# Patient Record
(demographics unavailable — no encounter records)

---

## 2025-04-30 NOTE — OB HISTORY
[Total Preg ___] : : [unfilled] [Full Term ___] : [unfilled] (full-term) [Abortions ___] : [unfilled] (abortions) [Living ___] : [unfilled] (living) [Vaginal ___] : [unfilled] vaginal delivery(s) [ ___] : [unfilled]  section delivery(s) [unknown] : the patient is unsure of the date of her LMP [Menarche Age ____] : age at menarche was [unfilled] [Menopause  Age ____] : menopause occurred at age [unfilled]

## 2025-05-04 NOTE — PHYSICAL EXAM
[MA] : MA [Absent] : CVAT: absent [Abnormal] : Bimanual Exam: Abnormal [Normal] : Cervix: Normal, no lesions [FreeTextEntry2] : Alyx [de-identified] : atrophy, scant thin discharge [de-identified] : The uterus was enlarged [de-identified] : The adnexae were nonpalp [de-identified] : The uterus was enlarged with a full ALEXIA

## 2025-05-04 NOTE — DISCUSSION/SUMMARY
[Reviewed Clinical Lab Test(s)] : Results of clinical tests were reviewed. [Reviewed Radiology Report(s)] : Radiology reports were reviewed. [FreeTextEntry1] : I met with the pt. -We discussed the clinical findings and the DDx, incl poss of malignancy (EM/Myoma).  -Management options were reviewed, including my advice for operative uterine sampling, given the described limited sampling. We disc the nature of D&C, H/D, as well as the rationale for sono guidance. We disc the pot for an unsuccessful proc as well as the poss need for further intervention.  -We also disc that there may be indication for a hyst/BSO if the myomatous change has increased in this stage of life (disc'd LMS). Disc that if this is the , I would favor an open proc through a vert inc. -Periop and recuperative issues were discussed, as were the possible hazards and risks of surgery, including but not limited to infection, thromboembolic disease and its life-threatening nature, transfusion, and surrounding organ injury (urinary, bowel, vascular, and neural), incision issues. -A diagram was drawn, and a copy provided. -I advised she review the option of a genetics evaluation and its rationale.  -I advised a clearance should surg be planned.  -Her instructions were reviewed. -All Q/A. -She states that she will f/u after she sees RR in f/u.  Addend: I spoke with RR re my eval. We also reviewed the available prior imaging going back to 2018, which seem to indicate an enlargement of the myomatous uterus during the postmenop time frame.  -Effort for the visit includes the note prep, review of prior material, interview, exam, further documentation, and coordination of care. -Effort for the visit includes the note prep, review of prior material, interview, exam, further documentation, and coordination of care.

## 2025-05-04 NOTE — HISTORY OF PRESENT ILLNESS
[FreeTextEntry1] : Referred by Dr. Abhijit Woodward Breast surg: Dr. Liz Pacheco PCP: Dr. Checo Capps   Ms. TUTTLE is a 65 year old  female LMP age 55, seen at the request of Dr. Abhijit Woodward for further evaluation of a thickened endometrium. She reports no post-menopausal bleeding or pelvic pain. Patient was originally referred to this office in 2019 by Dr. Abhijit Woodward for evaluation of thickened endometrial lining. Per patient report, the endometrial biopsy at that time was negative. She followed up in 2018 and another biopsy was obtained, also negative. Pt had sampling performed in this office with negative pathology. She was advised to follow-up with Dr. Woodward for GYN care.   2025 EMBx 1. Endometrium, biopsy - Rare and minute strips of inactive/atrophic endometrial epithelium within hemorrhagic material  4/10/2025 MR Pelvis ([facility] at [facility]) Uterus: Measures 7.8 x 9.1 x 11.1 cm. A solitary intramural myoma occupies the left uterine fundus and body measuring 7.4 x 7.5 x 7.0 cm.  The endometrial canal measures 1.6 cm, dilated for a post-menopausal female.  The junctional zone remains unremarkable.  No cervical abnormality is seen. Ovaries are identified and appear unremarkable. No pathologically enlarged pelvic lymph nodes are seen. There is no free fluid. The osseous structures appear normal. Impression: 1. Solitary intramural myoma occupying the left uterine fundus and body. 2. Dilated endometrial canal for a post-menopausal female  PMHx- HTN, GERD, asthma PSHx-  x1, myomectomy via Pfannenstiel incision with Dr. Knight in  (~20cm myoma, ext adhesions), LSC cholecystectomy, Rt breast lumpectomy (benign) Family hx of cancer- Sister with breast cancer, diagnosed in her 50s. Father with gastric cancer, diagnosed at age 70. No reported genetic testing.      Pap- 2024 Negative/HPV negative, has GYN appt on  Mammo- 2024 Calcified nodule noted in Rt breast, s/p lumpectomy in 2025, benign path. Report in chart. BHM by Dr. Liz Pacheco Colonoscopy- , benign findings per patient report. Due to repeat screening in 5 years.

## 2025-05-04 NOTE — HISTORY OF PRESENT ILLNESS
[FreeTextEntry1] : Referred by Dr. Abhijit Woodward Breast surg: Dr. Liz Pacheco PCP: Dr. Checo Capps   Ms. TUTTLE is a 65 year old  female LMP age 55, seen at the request of Dr. Abhijit Woodward for further evaluation of a thickened endometrium. She reports no post-menopausal bleeding or pelvic pain. Patient was originally referred to this office in 2019 by Dr. Abhijit Woodward for evaluation of thickened endometrial lining. Per patient report, the endometrial biopsy at that time was negative. She followed up in 2018 and another biopsy was obtained, also negative. Pt had sampling performed in this office with negative pathology. She was advised to follow-up with Dr. Woodward for GYN care.   2025 EMBx 1. Endometrium, biopsy - Rare and minute strips of inactive/atrophic endometrial epithelium within hemorrhagic material  4/10/2025 MR Pelvis ([facility] at [facility]) Uterus: Measures 7.8 x 9.1 x 11.1 cm. A solitary intramural myoma occupies the left uterine fundus and body measuring 7.4 x 7.5 x 7.0 cm.  The endometrial canal measures 1.6 cm, dilated for a post-menopausal female.  The junctional zone remains unremarkable.  No cervical abnormality is seen. Ovaries are identified and appear unremarkable. No pathologically enlarged pelvic lymph nodes are seen. There is no free fluid. The osseous structures appear normal. Impression: 1. Solitary intramural myoma occupying the left uterine fundus and body. 2. Dilated endometrial canal for a post-menopausal female  PMHx- HTN, GERD, asthma PSHx-  x1, myomectomy via Pfannenstiel incision with Dr. Knight in  (~20cm myoma, ext adhesions), LSC cholecystectomy, Rt breast lumpectomy (benign) Family hx of cancer- Sister with breast cancer, diagnosed in her 50s. Father with gastric cancer, diagnosed at age 70. No reported genetic testing.      Pap- 2024 Negative/HPV negative, has GYN appt on  Mammo- 2024 Calcified nodule noted in Rt breast, s/p lumpectomy in 2025, benign path. Report in chart. BHM by Dr. Liz Pacheco Colonoscopy- , benign findings per patient report. Due to repeat screening in 5 years.

## 2025-05-04 NOTE — PAST MEDICAL HISTORY
[Postmenopausal] : The patient is postmenopausal [Menarche Age ____] : age at menarche was [unfilled] [Menopause Age____] : age at menopause was [unfilled] [unknown] : the patient is unsure of the date of her LMP [Total Preg ___] : G[unfilled] [Live Births ___] : P[unfilled]  [Full Term ___] : Full Term: [unfilled] [Abortions ___] : Abortions:[unfilled] [Living ___] : Living: [unfilled] [AB Induced ___] : elective abortions: [unfilled]  [AB Spont ___] : miscarriages: [unfilled]  [FreeTextEntry5] : , myomectomy

## 2025-05-04 NOTE — PHYSICAL EXAM
[MA] : MA [Absent] : CVAT: absent [Abnormal] : Bimanual Exam: Abnormal [Normal] : Cervix: Normal, no lesions [FreeTextEntry2] : Alyx [de-identified] : atrophy, scant thin discharge [de-identified] : The uterus was enlarged [de-identified] : The adnexae were nonpalp [de-identified] : The uterus was enlarged with a full ALEXIA

## 2025-05-04 NOTE — HISTORY OF PRESENT ILLNESS
[FreeTextEntry1] : Referred by Dr. Abhijit Woodward Breast surg: Dr. Liz Pacheco PCP: Dr. Checo Capps   Ms. TUTTLE is a 65 year old  female LMP age 55, seen at the request of Dr. Abhijit Woodward for further evaluation of a thickened endometrium. She reports no post-menopausal bleeding or pelvic pain. Patient was originally referred to this office in 2019 by Dr. Abhijit Woodward for evaluation of thickened endometrial lining. Per patient report, the endometrial biopsy at that time was negative. She followed up in 2018 and another biopsy was obtained, also negative. Pt had sampling performed in this office with negative pathology. She was advised to follow-up with Dr. Woodward for GYN care.   2025 EMBx 1. Endometrium, biopsy - Rare and minute strips of inactive/atrophic endometrial epithelium within hemorrhagic material  4/10/2025 MR Pelvis ([facility] at [facility]) Uterus: Measures 7.8 x 9.1 x 11.1 cm. A solitary intramural myoma occupies the left uterine fundus and body measuring 7.4 x 7.5 x 7.0 cm.  The endometrial canal measures 1.6 cm, dilated for a post-menopausal female.  The junctional zone remains unremarkable.  No cervical abnormality is seen. Ovaries are identified and appear unremarkable. No pathologically enlarged pelvic lymph nodes are seen. There is no free fluid. The osseous structures appear normal. Impression: 1. Solitary intramural myoma occupying the left uterine fundus and body. 2. Dilated endometrial canal for a post-menopausal female  PMHx- HTN, GERD, asthma PSHx-  x1, myomectomy via Pfannenstiel incision with Dr. Knight in  (~20cm myoma, ext adhesions), LSC cholecystectomy, Rt breast lumpectomy (benign) Family hx of cancer- Sister with breast cancer, diagnosed in her 50s. Father with gastric cancer, diagnosed at age 70. No reported genetic testing.      Pap- 2024 Negative/HPV negative, has GYN appt on  Mammo- 2024 Calcified nodule noted in Rt breast, s/p lumpectomy in 2025, benign path. Report in chart. BHM by Dr. Liz Pacheco Colonoscopy- , benign findings per patient report. Due to repeat screening in 5 years.

## 2025-05-04 NOTE — PHYSICAL EXAM
[MA] : MA [Absent] : CVAT: absent [Abnormal] : Bimanual Exam: Abnormal [Normal] : Cervix: Normal, no lesions [FreeTextEntry2] : Alyx [de-identified] : atrophy, scant thin discharge [de-identified] : The uterus was enlarged [de-identified] : The adnexae were nonpalp [de-identified] : The uterus was enlarged with a full ALEXIA

## 2025-05-04 NOTE — HISTORY OF PRESENT ILLNESS
[FreeTextEntry1] : Referred by Dr. Abhijit Woodward Breast surg: Dr. Liz Pacheco PCP: Dr. Checo Capps   Ms. TUTTLE is a 65 year old  female LMP age 55, seen at the request of Dr. Abhijit Woodward for further evaluation of a thickened endometrium. She reports no post-menopausal bleeding or pelvic pain. Patient was originally referred to this office in 2019 by Dr. Abhijit Woodward for evaluation of thickened endometrial lining. Per patient report, the endometrial biopsy at that time was negative. She followed up in 2018 and another biopsy was obtained, also negative. Pt had sampling performed in this office with negative pathology. She was advised to follow-up with Dr. Woodward for GYN care.   2025 EMBx 1. Endometrium, biopsy - Rare and minute strips of inactive/atrophic endometrial epithelium within hemorrhagic material  4/10/2025 MR Pelvis ([facility] at [facility]) Uterus: Measures 7.8 x 9.1 x 11.1 cm. A solitary intramural myoma occupies the left uterine fundus and body measuring 7.4 x 7.5 x 7.0 cm.  The endometrial canal measures 1.6 cm, dilated for a post-menopausal female.  The junctional zone remains unremarkable.  No cervical abnormality is seen. Ovaries are identified and appear unremarkable. No pathologically enlarged pelvic lymph nodes are seen. There is no free fluid. The osseous structures appear normal. Impression: 1. Solitary intramural myoma occupying the left uterine fundus and body. 2. Dilated endometrial canal for a post-menopausal female  PMHx- HTN, GERD, asthma PSHx-  x1, myomectomy via Pfannenstiel incision with Dr. Knight in  (~20cm myoma, ext adhesions), LSC cholecystectomy, Rt breast lumpectomy (benign) Family hx of cancer- Sister with breast cancer, diagnosed in her 50s. Father with gastric cancer, diagnosed at age 70. No reported genetic testing.      Pap- 2024 Negative/HPV negative, has GYN appt on  Mammo- 2024 Calcified nodule noted in Rt breast, s/p lumpectomy in 2025, benign path. Report in chart. BHM by Dr. Liz Pacheco Colonoscopy- , benign findings per patient report. Due to repeat screening in 5 years.

## 2025-05-04 NOTE — PHYSICAL EXAM
[MA] : MA [Absent] : CVAT: absent [Abnormal] : Bimanual Exam: Abnormal [Normal] : Cervix: Normal, no lesions [FreeTextEntry2] : Alyx [de-identified] : atrophy, scant thin discharge [de-identified] : The uterus was enlarged [de-identified] : The adnexae were nonpalp [de-identified] : The uterus was enlarged with a full ALEXIA